# Patient Record
Sex: FEMALE | Race: WHITE
[De-identification: names, ages, dates, MRNs, and addresses within clinical notes are randomized per-mention and may not be internally consistent; named-entity substitution may affect disease eponyms.]

---

## 2018-03-10 ENCOUNTER — HOSPITAL ENCOUNTER (EMERGENCY)
Dept: HOSPITAL 59 - ER | Age: 58
Discharge: HOME | End: 2018-03-10
Payer: COMMERCIAL

## 2018-03-10 DIAGNOSIS — S82.831A: Primary | ICD-10-CM
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
## 2018-03-10 PROCEDURE — 99283 EMERGENCY DEPT VISIT LOW MDM: CPT

## 2018-03-10 NOTE — EMERGENCY DEPARTMENT RECORD
History of Present Illness





- General


Chief Complaint: Ankle/Foot Injury


Stated Complaint: INJURY TO RT ANKLE


Time Seen by Provider: 03/10/18 16:37


Source: Patient, RN notes reviewed


Mode of Arrival: Wheelchair





- History of Present Illness


Initial Comments: 


[place] with a cracking sound and swollen 

ankle just PTA





Onset/Timin


-: Minutes(s)


Severity: Mild


Improves With: Nothing


Worsens With: Nothing


Context: Fall, Walking, Other


Associated Symptoms: Snap/pop sensation, Swelling, Unable to bear weight





- Related Data


 Home Medications











 Medication  Instructions  Recorded  Confirmed  Last Taken


 


Clopidogrel Bisulfate [Plavix] 75 mg PO DAILY 03/10/18 03/10/18 03/10/18


 


Gabapentin [Neurontin] 300 mg PO DAILY 03/10/18 03/10/18 03/09/18


 


Hydrochlorothiazide 25 mg PO DAILY 03/10/18 03/10/18 03/10/18


 


Ibuprofen [Motrin] 800 mg PO Q8H PRN 03/10/18 03/10/18 03/10/18


 


Multivitamin [Daily Multiple 1 each PO DAILY 03/10/18 03/10/18 03/10/18





Vitamin]    


 


Potassium Chloride [Klor-Con] 20 meq PO DAILY 03/10/18 03/10/18 03/10/18


 


Zolpidem Tartrate [Ambien] 10 mg PO QHS 03/10/18 03/10/18 03/09/18








 Previous Rx's











 Medication  Instructions  Recorded


 


Hydrocodone/Acetaminophen [Norco 1 each PO Q6HR #14 tablet 03/10/18





5-325 Tablet]  











 Allergies











Allergy/AdvReac Type Severity Reaction Status Date / Time


 


Penicillins Allergy  RASH Verified 03/10/18 16:49


 


shellfish derived Allergy  ANAPHYLAXIS Verified 03/10/18 16:49














Travel Screening





- Travel/Exposure Within Last 30 Days


Have you traveled within the last 30 days?: No





- Travel/Exposure Within Last Year


Have you traveled outside the [place] in the last year?: No





- Additonal Travel Details


Have you been exposed to anyone with a communicable illness?: No





- Travel Symptoms


Symptom Screening: None





Review of Systems


Reviewed: No additional complaints except as noted below


Constitutional: Reports: As per HPI.  Denies: Chills, Fever, Malaise, Night 

sweats, Weakness, Weight change


Eyes: Reports: As per HPI.  Denies: Eye discharge, Eye pain, Photophobia, 

Vision change


ENT: Reports: As per HPI.  Denies: Congestion, Dental pain, Ear pain, Epistaxis

, Hearing loss, Throat pain


Respiratory: Reports: As per HPI.  Denies: Cough, Dyspnea, Hemoptysis, Stridor, 

Wheezes


Cardiovascular: Reports: As per HPI.  Denies: Arrhythmia, Chest pain, Dyspnea 

on exertion, Edema, Murmurs, Orthopnea, Palpitations, Paroxysmal nocturnal 

dyspnea, Rheumatic Fever, Syncope


Endocrine: Reports: As per HPI.  Denies: Fatigue, Heat or cold intolerance, 

Polydipsia, Polyuria


Gastrointestinal: Reports: As per HPI.  Denies: Abdominal pain, Constipation, 

Diarrhea, Hematemesis, Hematochezia, Melena, Nausea, Vomiting


Genitourinary: Reports: As per HPI.  Denies: Abnormal menses, Discharge, 

Dyspareunia, Dysuria, Frequency, Hematuria, Incontinence, Retention, Urgency


Musculoskeletal: Reports: As per HPI, Other (ankle pain right).  Denies: 

Arthralgia, Back pain, Gout, Joint swelling, Myalgia, Neck pain


Skin: Reports: As per HPI.  Denies: Bruising, Change in color, Change in hair/

nails, Lesions, Pruritus, Rash


Neurological: Reports: As per HPI.  Denies: Abnormal gait, Confusion, Headache, 

Numbness, Paresthesias, Seizure, Tingling, Tremors, Vertigo, Weakness


Psychiatric: Reports: As per HPI.  Denies: Anxiety, Auditory hallucinations, 

Depression, Homicidal thoughts, Suicidal thoughts, Visual hallucinations


Hematological/Lymphatic: Reports: As per HPI.  Denies: Anemia, Blood Clots, 

Easy bleeding, Easy bruising, Swollen glands





Past Medical History





- SOCIAL HISTORY


Smoking Status: Never smoker


Alcohol Use: None


Drug Use: None





- RESPIRATORY


Hx Respiratory Disorders: No





- CARDIOVASCULAR


Hx Cardio Disorders: Yes


Hx Hypertension: Yes





- NEURO


Hx Neuro Disorders: Yes


Hx TIA: Yes





- GI


Hx GI Disorders: No





- 


Hx Genitourinary Disorders: No





- ENDOCRINE


Hx Endocrine Disorders: No





- MUSCULOSKELETAL


Hx Musculoskeletal Disorders: Yes





- PSYCH


Hx Psych Problems: No





- HEMATOLOGY/ONCOLOGY


Hx Hematology/Oncology Disorders: No





Family Medical History


Any Significant Family History?: No





Physical Exam





- General


General Appearance: Alert, Oriented x3, Cooperative, No acute distress





- Head


Head exam: Normal inspection





- Eye


Eye exam: Normal appearance, PERRL


Pupils: Normal accommodation





- ENT


ENT exam: Normal exam, Mucous membranes moist, Normal external ear exam, Normal 

orophraynx, TM's normal bilaterally


Ear exam: Normal external inspection.  negative: External canal tenderness


Nasal Exam: Normal inspection.  negative: Discharge, Sinus tenderness


Mouth exam: Normal external inspection, Tongue normal


Teeth exam: Normal inspection.  negative: Dental caries


Throat exam: Normal inspection.  negative: Tonsillar erythema, Tonsillar exudate





- Neck


Neck exam: Normal inspection, Full ROM.  negative: Tenderness





- Respiratory


Respiratory exam: Normal lung sounds bilaterally.  negative: Respiratory 

distress





- Cardiovascular


Cardiovascular Exam: Regular rate, Normal rhythm, Normal heart sounds





- GI/Abdominal


GI/Abdominal exam: Soft, Normal bowel sounds.  negative: Tenderness





- Rectal


Rectal exam: Deferred





- 


 exam: Deferred





- Extremities


Extremities exam: Normal capillary refill, Tenderness (right ankle swelling)





- Back


Back exam: Reports: Normal inspection, Full ROM.  Denies: Muscle spasm, Rash 

noted, Tenderness





- Neurological


Neurological exam: Alert, Normal gait, Oriented X3, Reflexes normal





- Psychiatric


Psychiatric exam: Normal affect, Normal mood





- Skin


Skin exam: Dry, Intact, Normal color, Warm





Course





 Vital Signs











  03/10/18





  16:31


 


Temperature 98.0 F


 


Pulse Rate 83


 


Respiratory 20





Rate 


 


Blood Pressure 128/83


 


Pulse Ox 97














Disposition


Clinical Impression: 


Closed fibular fracture


Qualifiers:


 Encounter type: initial encounter Fibula location: distal Fracture morphology: 

unspecified fracture morphology Laterality: right Qualified Code(s): S82.831A - 

Other fracture of upper and lower end of right fibula, initial encounter for 

closed fracture





Disposition: Home, Self-Care


Condition: (1) Good


Instructions:  Ankle Fracture (ED)


Additional Instructions: 


follow up with Dr. Blas on thurs


motrin or tylenol for mild pain to moderate pain


Prescriptions: 


Hydrocodone/Acetaminophen [Norco 5-325 Tablet] 1 each PO Q6HR #14 tablet





Quality





- Quality Measures


Quality Measures: N/A





- Blood Pressure Screening


Does Patient Have Any of the Following: No


Blood Pressure Classification: Pre-Hypertensive BP Reading


Systolic Measurement: 128


Diastolic Measurement: 83


Screening for High Blood Pressure: < Pre-Hypertensive BP, F/U Documented > [

]


Pre-Hypertensive Follow-up Interventions: Referral to alternative/primary care 

provider.

## 2018-03-11 NOTE — RADIOLOGY REPORT
EXAM:  FOOT, RIGHT 3 VIEWS



HISTORY:  RIGHT FOOT AND ANKLE PAIN WITH SWELLING STATUS POST FALL TODAY.



TECHNIQUE:  Three views of the right foot were obtained.



COMPARISON:  Right ankle series from the same date.



FINDINGS:  There is a nondisplaced oblique fracture within the distal shaft of 
the fibula. The remaining bones appear intact. There is no other visible 
fracture. There are moderate arthritic changes within the midfoot with 
associated degenerative subchondral cysts in the cuboid, navicular, and medial 
cuneiform. Moderate arthritic changes are also present at the first 
metatarsophalangeal joint. There is a moderate to large plantar calcaneal spur.



IMPRESSION:  



1.  NONDISPLACED OBLIQUE FRACTURE WITHIN THE DISTAL SHAFT OF THE FIBULA.

2.  NO OTHER ACUTE FOOT PATHOLOGY.

3.  MODERATE MULTIFOCAL ARTHRITIC CHANGES.



JOB NUMBER:  [id number]
MTDD

## 2018-03-11 NOTE — RADIOLOGY REPORT
EXAM:  ANKLE RIGHT 3 VIEWS



HISTORY:  RIGHT FOOT AND ANKLE PAIN STATUS POST FALL TODAY.



TECHNIQUE:  Three views of the right ankle were obtained.



COMPARISON:  Right foot series from the same date.



FINDINGS:  There is a nondisplaced oblique fracture within the distal shaft of 
the fibula. There is no significant angulation. The ankle mortise appears 
intact. No other fractures are identified. A moderate to large plantar 
calcaneal spur is present. There are chronic calcific densities inferior to the 
medial malleolus. Moderate degenerative changes are present within the midfoot.



IMPRESSION:  



NONDISPLACED OBLIQUE FRACTURE WITHIN THE DISTAL SHAFT OF THE FIBULA.



JOB NUMBER:  [id number]
[facility]